# Patient Record
Sex: FEMALE | Race: WHITE | NOT HISPANIC OR LATINO | Employment: PART TIME | URBAN - NONMETROPOLITAN AREA
[De-identification: names, ages, dates, MRNs, and addresses within clinical notes are randomized per-mention and may not be internally consistent; named-entity substitution may affect disease eponyms.]

---

## 2023-06-12 ENCOUNTER — OFFICE VISIT (OUTPATIENT)
Dept: CARDIOLOGY | Facility: CLINIC | Age: 23
End: 2023-06-12
Payer: COMMERCIAL

## 2023-06-12 VITALS
BODY MASS INDEX: 28.28 KG/M2 | WEIGHT: 176 LBS | HEART RATE: 78 BPM | SYSTOLIC BLOOD PRESSURE: 120 MMHG | DIASTOLIC BLOOD PRESSURE: 70 MMHG | HEIGHT: 66 IN

## 2023-06-12 DIAGNOSIS — F41.0 PANIC ATTACKS: ICD-10-CM

## 2023-06-12 DIAGNOSIS — R00.0 TACHYCARDIA: ICD-10-CM

## 2023-06-12 DIAGNOSIS — R00.2 PALPITATIONS: Primary | ICD-10-CM

## 2023-06-12 DIAGNOSIS — R42 DIZZINESS: ICD-10-CM

## 2023-06-12 DIAGNOSIS — E88.81 METABOLIC SYNDROME: ICD-10-CM

## 2023-06-12 PROBLEM — E88.810 METABOLIC SYNDROME: Status: ACTIVE | Noted: 2023-06-12

## 2023-06-12 RX ORDER — LORATADINE 10 MG/1
10 TABLET ORAL DAILY
COMMUNITY

## 2023-06-12 RX ORDER — NORGESTIMATE AND ETHINYL ESTRADIOL 7DAYSX3 LO
1 KIT ORAL DAILY
COMMUNITY

## 2023-06-12 RX ORDER — BISOPROLOL FUMARATE 5 MG/1
5 TABLET, FILM COATED ORAL DAILY
Qty: 30 TABLET | Refills: 6 | Status: SHIPPED | OUTPATIENT
Start: 2023-06-12

## 2023-06-12 NOTE — LETTER
June 12, 2023       No Recipients    Patient: Zainab Trevino   YOB: 2000   Date of Visit: 6/12/2023       Dear Dr. Dodd Recipients:    Thank you for referring Zainab Trevino to me for evaluation. Below are the relevant portions of my assessment and plan of care.    If you have questions, please do not hesitate to call me. I look forward to following aZinab along with you.         Sincerely,        Carol Miramontes MD        CC:   No Recipients    Carol Miramontes MD  06/12/23 1405  Sign when Signing Visit  Chief Complaint   Patient presents with   • Establish Care     Referred for SVT and tachycardia. Holter report from 01/28/23, EKG, and last office note are in chart under media. Patient has never seen cardiology before. 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    • Aspirin     Patient is not on aspirin.    • Palpitations     States that she was feeling something in her chest last night every 5 to 10 minutes that would only last a second, she said if she had to rate on a pain scale, it would be about a 1.   • Dizziness     States that she has had panic attacks in the past and when she would come out of she would get dizzy for a few seconds and they would happen at random times.         CARDIAC COMPLAINTS  Dizziness and palpitations      Subjective  Zainab Trevino is a 22 y.o. female came in today for initial cardiac evaluation.  She came in with her aunt.  She has no previously diagnosed cardiac history.  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  Since January she has been having episodes of palpitation where she feels her heart racing.  The first time it happened she thought she is going to have a heart attack..  She notices her heart suddenly races lasting for few seconds to few minutes.  If it last for a longer time she has some dizziness.  She also noticed some mild shortness of breath if it persists.  She denies having any chest pain.   She did have one syncopal episode which happened after she had PPD test.  She also has history suggestive of panic attacks.  She tried taking Lexapro but she had some problem in the GI tract and stopped taking it.  She also had some lab work done and found to have a normal blood sugar, normal renal function, liver function.  Her TSH was normal.  I am not sure whether her TSH was checked at that time.  She also has been to the emergency room with above-mentioned problem.  She had a Holter monitor placed and it showed rare PAC and PVC.  She is now referred for further evaluation.  She has no history of smoking or drinking alcohol.  She drinks occasional caffeine.  There is no family history of congenital heart disease or sudden death.    Past Surgical History:   Procedure Laterality Date   • CONVERTED (HISTORICAL) HOLTER  01/28/2023    @ [place]. > 9 Days. Avg [phone]. Rare PAC & PVC       Current Outpatient Medications   Medication Sig Dispense Refill   • loratadine (CLARITIN) 10 MG tablet Take 1 tablet by mouth Daily.     • norgestimate-ethinyl estradiol (Ortho Tri-Cyclen Lo) 0.18/0.215/0.25 MG-25 MCG per tablet Take 1 tablet by mouth Daily.     • bisoprolol (ZEBeta) 5 MG tablet Take 1 tablet by mouth Daily. Start at 1/2 tab/ day for 1 week 30 tablet 6     No current facility-administered medications for this visit.           ALLERGIES:  Patient has no known allergies.    Past Medical History:   Diagnosis Date   • Anxiety    • Hx of tooth extraction        Social History     Tobacco Use   Smoking Status Never   Smokeless Tobacco Never          Family History   Problem Relation Age of Onset   • Cancer Maternal Grandmother    • Diabetes Maternal Grandmother    • Heart attack Maternal Great-Grandfather    • Cancer Maternal Great-Grandmother        Review of Systems   Constitutional: Negative for decreased appetite and malaise/fatigue.   HENT:  Negative for congestion and sore throat.    Eyes:  Negative for blurred  "vision, double vision and visual disturbance.   Cardiovascular:  Positive for palpitations. Negative for chest pain.   Respiratory:  Negative for shortness of breath and snoring.    Endocrine: Negative for cold intolerance and heat intolerance.   Hematologic/Lymphatic: Negative for adenopathy. Does not bruise/bleed easily.   Skin:  Negative for itching, nail changes and skin cancer.   Musculoskeletal:  Negative for arthritis and myalgias.   Gastrointestinal:  Negative for abdominal pain, dysphagia and heartburn.   Genitourinary:  Negative for bladder incontinence and frequency.   Neurological:  Positive for dizziness. Negative for seizures and vertigo.   Psychiatric/Behavioral:  Negative for altered mental status.    Allergic/Immunologic: Negative for environmental allergies and hives.     Diabetes- No  Thyroid- normal    Objective    /70 (BP Location: Left arm)   Pulse 78   Ht 167.6 cm (66\")   Wt 79.8 kg (176 lb)   BMI 28.41 kg/m²     Vitals and nursing note reviewed.   Constitutional:       Appearance: Healthy appearance. Not in distress.   Eyes:      Conjunctiva/sclera: Conjunctivae normal.      Pupils: Pupils are equal, round, and reactive to light.   HENT:      Head: Normocephalic.   Pulmonary:      Effort: Pulmonary effort is normal.      Breath sounds: Normal breath sounds.   Cardiovascular:      PMI at left midclavicular line. Normal rate. Regular rhythm.   Abdominal:      General: Bowel sounds are normal.      Palpations: Abdomen is soft.   Musculoskeletal: Normal range of motion.      Cervical back: Normal range of motion and neck supple. Skin:     General: Skin is warm and dry.   Neurological:      Mental Status: Alert, oriented to person, place, and time and oriented to person, place and time.       ECG 12 Lead    Date/Time: 6/12/2023 3:35 PM  Performed by: Carol Miramontes MD  Authorized by: Carol Miramontes MD   Comparison: compared with previous ECG from 1/30/2023  Similar to " previous ECG  Rhythm: sinus rhythm and sinus arrhythmia  Rate: normal  QRS axis: normal    Clinical impression: normal ECG        @ASSESSMENT/PLAN@  BMI is >= 25 and <30. (Overweight) The following options were offered after discussion;: weight loss educational material (shared in after visit summary), exercise counseling/recommendations, and nutrition counseling/recommendations     Diagnoses and all orders for this visit:    1. Palpitations (Primary)  -     bisoprolol (ZEBeta) 5 MG tablet; Take 1 tablet by mouth Daily. Start at 1/2 tab/ day for 1 week  Dispense: 30 tablet; Refill: 6  -     Treadmill Stress Test; Future  -     Adult Transthoracic Echo Complete W/ Cont if Necessary Per Protocol; Future    2. Tachycardia  -     bisoprolol (ZEBeta) 5 MG tablet; Take 1 tablet by mouth Daily. Start at 1/2 tab/ day for 1 week  Dispense: 30 tablet; Refill: 6    3. Metabolic syndrome    4. Dizziness  -     Adult Transthoracic Echo Complete W/ Cont if Necessary Per Protocol; Future    5. Panic attacks       At baseline her heart rate is stable.  Her blood pressure is normal.  Her EKG shows sinus rhythm with sinus arrhythmia, normal VT interval, normal QTc.  Her clinical examination reveals a BMI of 28.  Her cardiovascular examination is otherwise unremarkable.    Regarding the palpitation, it appears to be more of tachycardia.  I explained to her about sinus tachycardia and inappropriate sinus tachycardia.  At this time I like to start her on a low-dose of beta-blockers in the form of bisoprolol 5 mg.  She is advised to start with half a tablet a day for 1 week and then go to the 1 tablet.  I then like to do her regular stress test to evaluate her functional status, chronotropic response, blood pressure response and to look for any stress-induced arrhythmia.  She is also advised to undergo an echocardiogram to rule out any structural heart disease.  Evaluate her LV function, valvular structures as well as PA  pressure    Regarding the tachycardia, it could be a sinus tachycardia but if during the stress test at the arrhythmia is noted, based on the results she may need to go on 1C antiarrhythmic medication    Regarding her dizziness, I encouraged her to increase her fluid intake.  I like to get an echocardiogram to rule out any valvular heart disease    Regarding the metabolic syndrome, talked to her about diet and weight reduction.  I gave her papers on Mediterranean diet    Regarding her panic attacks, she is not able to tolerate Lexapro.  If it continues she may need to consider Zoloft or Prozac    Based on the results, further recommendations will be made.             Electronically signed by Carol Miramontes MD June 12, 2023 15:29 EDT

## 2023-06-12 NOTE — PROGRESS NOTES
Chief Complaint   Patient presents with   • Establish Care     Referred for SVT and tachycardia. Holter report from 01/28/23, EKG, and last office note are in chart under media. Patient has never seen cardiology before. 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    • Aspirin     Patient is not on aspirin.    • Palpitations     States that she was feeling something in her chest last night every 5 to 10 minutes that would only last a second, she said if she had to rate on a pain scale, it would be about a 1.   • Dizziness     States that she has had panic attacks in the past and when she would come out of she would get dizzy for a few seconds and they would happen at random times.         CARDIAC COMPLAINTS  Dizziness and palpitations      Subjective   Zainab Trevino is a 22 y.o. female came in today for initial cardiac evaluation.  She came in with her aunt.  She has no previously diagnosed cardiac history.  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  Since January she has been having episodes of palpitation where she feels her heart racing.  The first time it happened she thought she is going to have a heart attack..  She notices her heart suddenly races lasting for few seconds to few minutes.  If it last for a longer time she has some dizziness.  She also noticed some mild shortness of breath if it persists.  She denies having any chest pain.  She did have one syncopal episode which happened after she had PPD test.  She also has history suggestive of panic attacks.  She tried taking Lexapro but she had some problem in the GI tract and stopped taking it.  She also had some lab work done and found to have a normal blood sugar, normal renal function, liver function.  Her TSH was normal.  I am not sure whether her TSH was checked at that time.  She also has been to the emergency room with above-mentioned problem.  She had a Holter monitor placed and it showed rare PAC and  PVC.  She is now referred for further evaluation.  She has no history of smoking or drinking alcohol.  She drinks occasional caffeine.  There is no family history of congenital heart disease or sudden death.    Past Surgical History:   Procedure Laterality Date   • CONVERTED (HISTORICAL) HOLTER  01/28/2023    @ [place]. > 9 Days. Avg 74.. Rare PAC & PVC       Current Outpatient Medications   Medication Sig Dispense Refill   • loratadine (CLARITIN) 10 MG tablet Take 1 tablet by mouth Daily.     • norgestimate-ethinyl estradiol (Ortho Tri-Cyclen Lo) 0.18/0.215/0.25 MG-25 MCG per tablet Take 1 tablet by mouth Daily.     • bisoprolol (ZEBeta) 5 MG tablet Take 1 tablet by mouth Daily. Start at 1/2 tab/ day for 1 week 30 tablet 6     No current facility-administered medications for this visit.           ALLERGIES:  Patient has no known allergies.    Past Medical History:   Diagnosis Date   • Anxiety    • Hx of tooth extraction        Social History     Tobacco Use   Smoking Status Never   Smokeless Tobacco Never          Family History   Problem Relation Age of Onset   • Cancer Maternal Grandmother    • Diabetes Maternal Grandmother    • Heart attack Maternal Great-Grandfather    • Cancer Maternal Great-Grandmother        Review of Systems   Constitutional: Negative for decreased appetite and malaise/fatigue.   HENT:  Negative for congestion and sore throat.    Eyes:  Negative for blurred vision, double vision and visual disturbance.   Cardiovascular:  Positive for palpitations. Negative for chest pain.   Respiratory:  Negative for shortness of breath and snoring.    Endocrine: Negative for cold intolerance and heat intolerance.   Hematologic/Lymphatic: Negative for adenopathy. Does not bruise/bleed easily.   Skin:  Negative for itching, nail changes and skin cancer.   Musculoskeletal:  Negative for arthritis and myalgias.   Gastrointestinal:  Negative for abdominal pain, dysphagia and heartburn.   Genitourinary:   "Negative for bladder incontinence and frequency.   Neurological:  Positive for dizziness. Negative for seizures and vertigo.   Psychiatric/Behavioral:  Negative for altered mental status.    Allergic/Immunologic: Negative for environmental allergies and hives.     Diabetes- No  Thyroid- normal    Objective     /70 (BP Location: Left arm)   Pulse 78   Ht 167.6 cm (66\")   Wt 79.8 kg (176 lb)   BMI 28.41 kg/m²     Vitals and nursing note reviewed.   Constitutional:       Appearance: Healthy appearance. Not in distress.   Eyes:      Conjunctiva/sclera: Conjunctivae normal.      Pupils: Pupils are equal, round, and reactive to light.   HENT:      Head: Normocephalic.   Pulmonary:      Effort: Pulmonary effort is normal.      Breath sounds: Normal breath sounds.   Cardiovascular:      PMI at left midclavicular line. Normal rate. Regular rhythm.   Abdominal:      General: Bowel sounds are normal.      Palpations: Abdomen is soft.   Musculoskeletal: Normal range of motion.      Cervical back: Normal range of motion and neck supple. Skin:     General: Skin is warm and dry.   Neurological:      Mental Status: Alert, oriented to person, place, and time and oriented to person, place and time.       ECG 12 Lead    Date/Time: 6/12/2023 3:35 PM  Performed by: Carol Miramontes MD  Authorized by: Carol Miramontes MD   Comparison: compared with previous ECG from 1/30/2023  Similar to previous ECG  Rhythm: sinus rhythm and sinus arrhythmia  Rate: normal  QRS axis: normal    Clinical impression: normal ECG        @ASSESSMENT/PLAN@  BMI is >= 25 and <30. (Overweight) The following options were offered after discussion;: weight loss educational material (shared in after visit summary), exercise counseling/recommendations, and nutrition counseling/recommendations     Diagnoses and all orders for this visit:    1. Palpitations (Primary)  -     bisoprolol (ZEBeta) 5 MG tablet; Take 1 tablet by mouth Daily. Start at 1/2 " tab/ day for 1 week  Dispense: 30 tablet; Refill: 6  -     Treadmill Stress Test; Future  -     Adult Transthoracic Echo Complete W/ Cont if Necessary Per Protocol; Future    2. Tachycardia  -     bisoprolol (ZEBeta) 5 MG tablet; Take 1 tablet by mouth Daily. Start at 1/2 tab/ day for 1 week  Dispense: 30 tablet; Refill: 6    3. Metabolic syndrome    4. Dizziness  -     Adult Transthoracic Echo Complete W/ Cont if Necessary Per Protocol; Future    5. Panic attacks       At baseline her heart rate is stable.  Her blood pressure is normal.  Her EKG shows sinus rhythm with sinus arrhythmia, normal [place] interval, normal QTc.  Her clinical examination reveals a BMI of 28.  Her cardiovascular examination is otherwise unremarkable.    Regarding the palpitation, it appears to be more of tachycardia.  I explained to her about sinus tachycardia and inappropriate sinus tachycardia.  At this time I like to start her on a low-dose of beta-blockers in the form of bisoprolol 5 mg.  She is advised to start with half a tablet a day for 1 week and then go to the 1 tablet.  I then like to do her regular stress test to evaluate her functional status, chronotropic response, blood pressure response and to look for any stress-induced arrhythmia.  She is also advised to undergo an echocardiogram to rule out any structural heart disease.  Evaluate her LV function, valvular structures as well as PA pressure    Regarding the tachycardia, it could be a sinus tachycardia but if during the stress test at the arrhythmia is noted, based on the results she may need to go on 1C antiarrhythmic medication    Regarding her dizziness, I encouraged her to increase her fluid intake.  I like to get an echocardiogram to rule out any valvular heart disease    Regarding the metabolic syndrome, talked to her about diet and weight reduction.  I gave her papers on Mediterranean diet    Regarding her panic attacks, she is not able to tolerate Lexapro.  If it  continues she may need to consider Zoloft or Prozac    Based on the results, further recommendations will be made.             Electronically signed by Carol Miramontes MD June 12, 2023 15:29 EDT

## 2023-12-22 DIAGNOSIS — R00.2 PALPITATIONS: ICD-10-CM

## 2023-12-22 DIAGNOSIS — R00.0 TACHYCARDIA: ICD-10-CM

## 2023-12-22 RX ORDER — BISOPROLOL FUMARATE 5 MG/1
TABLET, FILM COATED ORAL
Qty: 30 TABLET | Refills: 6 | Status: SHIPPED | OUTPATIENT
Start: 2023-12-22

## 2024-01-02 ENCOUNTER — OFFICE VISIT (OUTPATIENT)
Dept: CARDIOLOGY | Facility: CLINIC | Age: 24
End: 2024-01-02
Payer: COMMERCIAL

## 2024-01-02 VITALS
HEART RATE: 78 BPM | SYSTOLIC BLOOD PRESSURE: 126 MMHG | HEIGHT: 66 IN | WEIGHT: 189.2 LBS | DIASTOLIC BLOOD PRESSURE: 70 MMHG | BODY MASS INDEX: 30.41 KG/M2

## 2024-01-02 DIAGNOSIS — R00.2 PALPITATIONS: Primary | ICD-10-CM

## 2024-01-02 DIAGNOSIS — R00.0 TACHYCARDIA: ICD-10-CM

## 2024-01-02 DIAGNOSIS — F41.9 ANXIETY: ICD-10-CM

## 2024-01-02 DIAGNOSIS — E66.9 OBESITY (BMI 30.0-34.9): ICD-10-CM

## 2024-01-02 PROCEDURE — 99214 OFFICE O/P EST MOD 30 MIN: CPT | Performed by: NURSE PRACTITIONER

## 2024-01-02 PROCEDURE — 1159F MED LIST DOCD IN RCRD: CPT | Performed by: NURSE PRACTITIONER

## 2024-01-02 PROCEDURE — 1160F RVW MEDS BY RX/DR IN RCRD: CPT | Performed by: NURSE PRACTITIONER

## 2024-01-02 RX ORDER — SERTRALINE HYDROCHLORIDE 25 MG/1
25 TABLET, FILM COATED ORAL DAILY
Qty: 30 TABLET | Refills: 3 | Status: SHIPPED | OUTPATIENT
Start: 2024-01-02

## 2024-01-02 RX ORDER — BISOPROLOL FUMARATE 5 MG/1
5 TABLET, FILM COATED ORAL DAILY
Qty: 90 TABLET | Refills: 4 | Status: SHIPPED | OUTPATIENT
Start: 2024-01-02

## 2024-01-02 RX ORDER — BISOPROLOL FUMARATE 5 MG/1
5 TABLET, FILM COATED ORAL DAILY
COMMUNITY
End: 2024-01-02 | Stop reason: SDUPTHER

## 2024-01-02 NOTE — PROGRESS NOTES
Chief Complaint   Patient presents with    Follow-up     Cardiac management. Has frequent episodes of palpitations , she feels is related to anxiety    LABS     No current  labs    Med Refill     Needs  refills  on Zebeta 90 day supply to [facility]       Subjective       Zainab Trevino is a 23 y.o. female initially seen in June 2023 for evaluation of heart palpitations sometimes associated with dizziness.  Cardiac monitor had been worn showing rare PACs and PVCs. Bisoprolol prescribed.    On 6/27/2023 echocardiogram showed normal LV size and function, No significant valve issues, and RVSP 23 mmHg.  Treadmill stress test showed mild increase in heart rate response but normal BP.  Test was negative for stress-induced ischemia or arrhythmia.    Today she returns to the office for follow-up visit.  Overall she has had improvement of palpitations since addition of bisoprolol.  However, with anxiety she continues to have increased heart rate.  She is now working as a nurse night shift, 2 different jobs.  She admits to drinking [employer] coffee and other caffeinated beverages.  In the past she tried Lexapro for management of anxiety but did not tolerate due to GI side effects.  She ask if another medication could be tried.      Cardiac History:    Past Surgical History:   Procedure Laterality Date    CARDIOVASCULAR STRESS TEST  06/27/2023    R.Stress- 9.27 Min. 11.5 METS. 90% THR. 150/46. Negative    CONVERTED (HISTORICAL) HOLTER  01/28/2023    @ [place]. > 9 Days. Avg 74.. Rare PAC & PVC    ECHO - CONVERTED  06/27/2023    EF 65%. Trace MR. RVSP- 21 mmHg       Current Outpatient Medications   Medication Sig Dispense Refill    bisoprolol (ZEBeta) 5 MG tablet Take 1 tablet by mouth Daily. 90 tablet 4    loratadine (CLARITIN) 10 MG tablet Take 1 tablet by mouth Daily.      norgestimate-ethinyl estradiol (Ortho Tri-Cyclen Lo) 0.18/0.215/0.25 MG-25 MCG per tablet Take 1 tablet by mouth Daily.      sertraline (Zoloft) 25  "MG tablet Take 1 tablet by mouth Daily. 30 tablet 3     No current facility-administered medications for this visit.       Patient has no known allergies.    Past Medical History:   Diagnosis Date    Anxiety     Hx of tooth extraction        Social History     Socioeconomic History    Marital status: Single   Tobacco Use    Smoking status: Never    Smokeless tobacco: Never   Vaping Use    Vaping Use: Never used   Substance and Sexual Activity    Alcohol use: Never    Drug use: Never       Family History   Problem Relation Age of Onset    Cancer Maternal Grandmother     Diabetes Maternal Grandmother     Heart attack Maternal Great-Grandfather     Cancer Maternal Great-Grandmother        Review of Systems   Constitutional: Positive for weight gain. Negative for diaphoresis and malaise/fatigue.   Eyes:  Negative for visual disturbance.   Cardiovascular:  Positive for palpitations.   Respiratory:  Negative for shortness of breath and sleep disturbances due to breathing.    Gastrointestinal:  Negative for change in bowel habit and nausea.   Genitourinary:  Negative for dysuria.   Psychiatric/Behavioral:  The patient is nervous/anxious.         BP Readings from Last 5 Encounters:   01/02/24 126/70   06/12/23 120/70       Wt Readings from Last 5 Encounters:   01/02/24 85.8 kg (189 lb 3.2 oz)   06/27/23 79.8 kg (175 lb 14.8 oz)   06/12/23 79.8 kg (176 lb)       Objective     /70 (BP Location: Left arm, Patient Position: Sitting)   Pulse 78   Ht 167.6 cm (66\")   Wt 85.8 kg (189 lb 3.2 oz)   BMI 30.54 kg/m²     Vitals and nursing note reviewed.   Constitutional:       Appearance: Healthy appearance. Not in distress.   Eyes:      Conjunctiva/sclera: Conjunctivae normal.      Pupils: Pupils are equal, round, and reactive to light.   HENT:      Head: Normocephalic.   Pulmonary:      Effort: Pulmonary effort is normal.      Breath sounds: Normal breath sounds.   Cardiovascular:      PMI at left midclavicular line. " Normal rate. Regular rhythm.      Murmurs: There is no murmur.   Pulses:     Intact distal pulses.   Edema:     Peripheral edema absent.   Abdominal:      General: Bowel sounds are normal.      Palpations: Abdomen is soft.   Musculoskeletal: Normal range of motion.      Cervical back: Normal range of motion and neck supple. Skin:     General: Skin is warm and dry.   Neurological:      Mental Status: Alert, oriented to person, place, and time and oriented to person, place and time.          Procedures: None today         Assessment & Plan   Diagnoses and all orders for this visit:    1. Palpitations (Primary)  -     bisoprolol (ZEBeta) 5 MG tablet; Take 1 tablet by mouth Daily.  Dispense: 90 tablet; Refill: 4    2. Tachycardia  -     bisoprolol (ZEBeta) 5 MG tablet; Take 1 tablet by mouth Daily.  Dispense: 90 tablet; Refill: 4    3. Anxiety  -     sertraline (Zoloft) 25 MG tablet; Take 1 tablet by mouth Daily.  Dispense: 30 tablet; Refill: 3    4. Obesity (BMI 30.0-34.9)      Palpitations/tachycardia  - reports of her recent stress test and echocardiogram reviewed:  EKG negative for arrhythmia.  Blood pressure response normal but heart rate response was mildly increased.    -Continue beta-blocker in form of bisoprolol 5 mg daily.  -Discussed maintaining limited caffeine.  Informational handout on heart palpitations and triggers to avoid provided  -Stress and anxiety management encouraged    Anxiety  -Discussed nonmedication management modalities  -We will trial low-dose SSRI in form of Zoloft daily    If palpitations worsen despite management then consider cardiac monitor    Advised to maintain routine labs for surveillance of electrolytes and thyroid function.    Obesity  -Weight is up 15 pounds and BMI now 30.  Discussed diet, exercise, and weight loss.    Annual follow-up visit scheduled.

## 2024-04-10 RX ORDER — FLUOXETINE 10 MG/1
10 CAPSULE ORAL DAILY
Qty: 30 CAPSULE | Refills: 6 | Status: SHIPPED | OUTPATIENT
Start: 2024-04-10

## 2024-05-07 ENCOUNTER — TELEPHONE (OUTPATIENT)
Dept: CARDIOLOGY | Facility: CLINIC | Age: 24
End: 2024-05-07
Payer: COMMERCIAL

## 2024-07-02 ENCOUNTER — TELEPHONE (OUTPATIENT)
Dept: CARDIOLOGY | Facility: CLINIC | Age: 24
End: 2024-07-02
Payer: COMMERCIAL

## 2024-07-02 NOTE — TELEPHONE ENCOUNTER
Caller: DARYL METZ    Relationship to patient: Emergency Contact    Best call back number: [phone]    Chief complaint: COULDN'T GET OFF WORK FOR HER YEARLY    Type of visit: YEARLY    Requested date: PT WORKS MONDAY, TUESDAY, AND WEDNESDAY NIGHTS. PROBABLY SOMETHING EARLY MORNING.      If rescheduling, when is the original appointment: 07.03.24     PT HAS MYCHART AND CAN LEAVE MESSAGES THERE.

## 2024-07-03 ENCOUNTER — TELEPHONE (OUTPATIENT)
Dept: CARDIOLOGY | Facility: CLINIC | Age: 24
End: 2024-07-03
Payer: COMMERCIAL

## 2024-07-03 NOTE — TELEPHONE ENCOUNTER
Caller: DARYL METZ    Relationship to patient: Emergency Contact    Best call back number: [phone]    Chief complaint: ONE PUPIL BIGGER THAN THE OTHER. CONCERNED ITS A SIGN OF STROKE    Type of visit: FOLLOW UP    Requested date: ASAP     If rescheduling, when is the original appointment: 8.26.24     Additional notes:PATIENTS AUNT CALLED TO SEE IF HER NIECE NEEDS TO BE SEEN SOONER THAN 8.26.24 REGARDING HER PUPILS BEING DIFFERENT SIZES. SHE'S WORRIED THAT'S A SIGN OF A STROKE. PLEASE CALL HER BACK WHEN POSSIBLE TO DISCUSS THIS. THANK YOU

## 2024-07-03 NOTE — TELEPHONE ENCOUNTER
Spoke with patient's aunt, Catina. She was made aware that if they are concerned about possible stroke that patient does need to go to ER. She states that patient does not have any facial drooping, slurred speech, or numbness. She states that patient worked all night and has not had much rest and noticed that pupils were different sizes.     She was made aware that patient had requested an appointment after 1 pm and first available was the 08/26/24 appointment, but that we did have an opening on 07/10/24 at 9 am with DELILAH Naqvi if patient could make that appointment. Patient's aunt asked for patient to be changed to 07/10/24 at 9 am.

## 2024-07-11 ENCOUNTER — OFFICE VISIT (OUTPATIENT)
Dept: CARDIOLOGY | Facility: CLINIC | Age: 24
End: 2024-07-11
Payer: COMMERCIAL

## 2024-07-11 VITALS
BODY MASS INDEX: 25.07 KG/M2 | HEART RATE: 72 BPM | WEIGHT: 156 LBS | HEIGHT: 66 IN | DIASTOLIC BLOOD PRESSURE: 64 MMHG | SYSTOLIC BLOOD PRESSURE: 112 MMHG

## 2024-07-11 DIAGNOSIS — R00.0 TACHYCARDIA: ICD-10-CM

## 2024-07-11 DIAGNOSIS — R00.2 PALPITATIONS: Primary | ICD-10-CM

## 2024-07-11 DIAGNOSIS — F41.9 ANXIETY: ICD-10-CM

## 2024-07-11 PROCEDURE — 99213 OFFICE O/P EST LOW 20 MIN: CPT | Performed by: NURSE PRACTITIONER

## 2024-07-11 RX ORDER — BISOPROLOL FUMARATE 5 MG/1
5 TABLET, FILM COATED ORAL DAILY
Qty: 90 TABLET | Refills: 4 | Status: SHIPPED | OUTPATIENT
Start: 2024-07-11

## 2024-07-11 NOTE — PROGRESS NOTES
Chief Complaint   Patient presents with    Follow-up     Pt is here for cardiac follow up.  Pt denies CP, SOB, dizziness or palpitations.  She does not take a daily ASA.      Med Refill     Pt request 90 day refills to be sent to [facility].  Medications were verified by the pt.      Lab Work     Pt states their last labs were over a year ago with her PCP.         Subjective       Zainab Uriel is a 23 y.o. female initially seen in June 2023 for evaluation of heart palpitations sometimes associated with dizziness.  Cardiac monitor had been worn showing rare PACs and PVCs. Bisoprolol prescribed.     On 6/27/2023 echocardiogram showed normal LV size and function, No significant valve issues, and RVSP 23 mmHg.  Treadmill stress test showed mild increase in heart rate response but normal BP.  Test was negative for stress-induced ischemia or arrhythmia.    At last visit Zoloft was prescribed for anxiety/stress management.  Beta-blocker was continued for management of palpitations.    Today she returns to the office for a follow-up visit.  From a cardiac standpoint she is doing very well with no recent palpitations or increase shortness of breath.  She admits to better management of stress and anxiety and remains on Zoloft without side effects noted.  Recently she has lost some weight and attributes to better diet.  Overall she feels she is doing very well and is planning a wedding for September of this year.    Cardiac History:    Past Surgical History:   Procedure Laterality Date    CARDIOVASCULAR STRESS TEST  06/27/2023    R.Stress- 9.27 Min. 11.5 METS. 90% THR. 150/46. Negative    CONVERTED (HISTORICAL) HOLTER  01/28/2023    @ [facility]. > 9 Days. Avg 74.. Rare PAC & PVC    ECHO - CONVERTED  06/27/2023    EF 65%. Trace MR. RVSP- 21 mmHg       Current Outpatient Medications   Medication Sig Dispense Refill    bisoprolol (ZEBeta) 5 MG tablet Take 1 tablet by mouth Daily. 90 tablet 4    sertraline (ZOLOFT) 50 MG  "tablet Take 1 tablet by mouth Daily. 90 tablet 4     No current facility-administered medications for this visit.       Patient has no known allergies.    Past Medical History:   Diagnosis Date    Anxiety     Arrhythmia 1/23    Hx of tooth extraction        Social History     Socioeconomic History    Marital status: Single   Tobacco Use    Smoking status: Never    Smokeless tobacco: Never   Vaping Use    Vaping status: Never Used   Substance and Sexual Activity    Alcohol use: Never    Drug use: Never    Sexual activity: Yes     Partners: Male     Birth control/protection: Condom     Comment: Stopped Birthcontrol pill       Family History   Problem Relation Age of Onset    Cancer Maternal Grandmother     Diabetes Maternal Grandmother     Heart failure Maternal Grandmother     Heart attack Maternal Great-Grandfather     Cancer Maternal Great-Grandmother     Heart attack Paternal Grandfather        Review of Systems   Cardiovascular: Negative.    Respiratory: Negative.     Psychiatric/Behavioral:  The patient is nervous/anxious (Currently well-managed).         BP Readings from Last 5 Encounters:   07/11/24 112/64   01/02/24 126/70   06/12/23 120/70       Wt Readings from Last 5 Encounters:   07/11/24 70.8 kg (156 lb)   01/02/24 85.8 kg (189 lb 3.2 oz)   06/27/23 79.8 kg (175 lb 14.8 oz)   06/12/23 79.8 kg (176 lb)       Objective     /64 (BP Location: Left arm, Patient Position: Sitting)   Pulse 72   Ht 167.6 cm (66\")   Wt 70.8 kg (156 lb)   BMI 25.18 kg/m²     Vitals and nursing note reviewed.   Constitutional:       Appearance: Healthy appearance. Not in distress.   Eyes:      Conjunctiva/sclera: Conjunctivae normal.      Pupils: Pupils are equal, round, and reactive to light.   HENT:      Head: Normocephalic.   Pulmonary:      Effort: Pulmonary effort is normal.      Breath sounds: Normal breath sounds.   Cardiovascular:      PMI at left midclavicular line. Normal rate. Regular rhythm.   Edema:     " Peripheral edema absent.   Abdominal:      General: Bowel sounds are normal.      Palpations: Abdomen is soft.   Musculoskeletal: Normal range of motion.      Cervical back: Normal range of motion and neck supple. Skin:     General: Skin is warm and dry.   Neurological:      Mental Status: Alert, oriented to person, place, and time and oriented to person, place and time.          Procedures: None today         Assessment & Plan   Diagnoses and all orders for this visit:    1. Palpitations (Primary)  -     bisoprolol (ZEBeta) 5 MG tablet; Take 1 tablet by mouth Daily.  Dispense: 90 tablet; Refill: 4    2. Tachycardia  -     bisoprolol (ZEBeta) 5 MG tablet; Take 1 tablet by mouth Daily.  Dispense: 90 tablet; Refill: 4    3. Anxiety    Other orders  -     sertraline (ZOLOFT) 50 MG tablet; Take 1 tablet by mouth Daily.  Dispense: 90 tablet; Refill: 4    Palpitations/tachycardia  -No recent symptoms or concerns  -BP, heart rate and rhythm normal  -Continue Zebeta 5 mg daily  -Continue conservative management including limited caffeine, adequate hydration, diet and exercise    Anxiety  -Admits currently well-managed and tolerating Zoloft well    Congratulations were given for upcoming wedding    Annual follow-up visit scheduled.             Electronically signed by DELILAH Allen,  July 11, 2024 17:35 EDT    Dictated Utilizing Dragon Dictation: Part of this note may be an electronic transcription/translation of spoken language to printed text using the Dragon Dictation System.

## 2024-12-20 RX ORDER — LABETALOL 100 MG/1
TABLET, FILM COATED ORAL
Qty: 60 TABLET | Refills: 6 | Status: SHIPPED | OUTPATIENT
Start: 2024-12-20

## 2025-07-10 ENCOUNTER — OFFICE VISIT (OUTPATIENT)
Dept: CARDIOLOGY | Facility: CLINIC | Age: 25
End: 2025-07-10
Payer: COMMERCIAL

## 2025-07-10 VITALS
HEIGHT: 66 IN | SYSTOLIC BLOOD PRESSURE: 120 MMHG | DIASTOLIC BLOOD PRESSURE: 80 MMHG | WEIGHT: 193.4 LBS | HEART RATE: 111 BPM | BODY MASS INDEX: 31.08 KG/M2 | OXYGEN SATURATION: 100 %

## 2025-07-10 DIAGNOSIS — R00.2 PALPITATIONS: ICD-10-CM

## 2025-07-10 DIAGNOSIS — E66.811 OBESITY (BMI 30.0-34.9): ICD-10-CM

## 2025-07-10 DIAGNOSIS — F41.0 PANIC ATTACKS: ICD-10-CM

## 2025-07-10 DIAGNOSIS — R00.0 SINUS TACHYCARDIA: Primary | ICD-10-CM

## 2025-07-10 PROCEDURE — 93000 ELECTROCARDIOGRAM COMPLETE: CPT | Performed by: NURSE PRACTITIONER

## 2025-07-10 PROCEDURE — 99213 OFFICE O/P EST LOW 20 MIN: CPT | Performed by: NURSE PRACTITIONER

## 2025-07-10 RX ORDER — DULOXETINE HYDROCHLORIDE 60 MG/1
CAPSULE, DELAYED RELEASE ORAL
COMMUNITY
Start: 2025-04-03

## 2025-07-10 RX ORDER — LABETALOL 100 MG/1
100 TABLET, FILM COATED ORAL 2 TIMES DAILY
Qty: 180 TABLET | Refills: 2 | Status: SHIPPED | OUTPATIENT
Start: 2025-07-10

## 2025-07-10 NOTE — PROGRESS NOTES
Chief Complaint   Patient presents with    Follow-up     Cardiac management - last seen 7/2024 with Aileen MAZARIEGOS     Palpitations     She states no palpitations or chest pain / tightness     labs     Last set 2023    medication     Went over verbally     Daily aspirin     No refills     Patient did not bring med list or medicine bottles to appointment, med list has been reviewed and updated based on patient's knowledge of their meds.         Cardiac Complaints  palpitations      Subjective   Zainab Uriel is a 24 y.o. female with palpitations and anxiety. She was initially seen in 2023 for evaluation of the same. Holter in 2023 showed PAC/PVC and tachycardia.    On 6/27/2023 echocardiogram showed normal LV size and function, No significant valve issues, and RVSP 23 mmHg. Treadmill stress test showed mild increase in heart rate response but normal BP. Test was negative for stress-induced ischemia or arrhythmia.     She comes today for follow up and admits she is no longer taking her zoloft or labetalol, now on cymbalta. She recently lost a baby and admits this has been difficult, causing more anxiety. High caffeine intake noted, but reports she has been trying to cut back. Labs are followed by PCP, she is unsure of last check.            Cardiac History  Past Surgical History:   Procedure Laterality Date    CARDIOVASCULAR STRESS TEST  06/27/2023    R.Stress- 9.27 Min. 11.5 METS. 90% THR. 150/46. Negative    CONVERTED (HISTORICAL) HOLTER  01/28/2023    @ [facility]. > 9 Days. Avg 74.. Rare PAC & PVC    ECHO - CONVERTED  06/27/2023    EF 65%. Trace MR. RVSP- 21 mmHg       Current Outpatient Medications   Medication Sig Dispense Refill    Cymbalta 60 MG capsule       labetalol (NORMODYNE) 100 MG tablet Take 1 tablet by mouth 2 (Two) Times a Day. 180 tablet 2     No current facility-administered medications for this visit.       Patient has no known allergies.    Past Medical History:   Diagnosis Date    Anxiety  "    Arrhythmia 1/23    Hx of tooth extraction        Social History     Socioeconomic History    Marital status:    Tobacco Use    Smoking status: Never    Smokeless tobacco: Never   Vaping Use    Vaping status: Never Used   Substance and Sexual Activity    Alcohol use: Never    Drug use: Never    Sexual activity: Yes     Partners: Male     Birth control/protection: Condom     Comment: Stopped Birthcontrol pill       Family History   Problem Relation Age of Onset    Cancer Maternal Grandmother     Diabetes Maternal Grandmother     Heart failure Maternal Grandmother     Heart attack Maternal Great-Grandfather     Cancer Maternal Great-Grandmother     Heart attack Paternal Grandfather        Review of Systems   Constitutional: Negative for malaise/fatigue and night sweats.   Cardiovascular:  Positive for palpitations. Negative for chest pain, claudication, dyspnea on exertion, irregular heartbeat, leg swelling, near-syncope and syncope.   Respiratory:  Negative for cough, shortness of breath and wheezing.    Musculoskeletal:  Positive for stiffness. Negative for back pain and joint pain.   Gastrointestinal:  Negative for anorexia, heartburn, nausea and vomiting.   Genitourinary:  Negative for dysuria, hematuria, hesitancy and nocturia.   Neurological:  Negative for dizziness, headaches and weakness.   Psychiatric/Behavioral:  Positive for depression. The patient is nervous/anxious.            Objective     /80 (BP Location: Left arm, Patient Position: Sitting, Cuff Size: Adult)   Pulse 111   Ht 167.6 cm (66\")   Wt 87.7 kg (193 lb 6.4 oz)   SpO2 100%   BMI 31.22 kg/m²     Constitutional:       Appearance: Not in distress.   Eyes:      Pupils: Pupils are equal, round, and reactive to light.   HENT:      Nose: Nose normal.   Pulmonary:      Effort: Pulmonary effort is normal.      Breath sounds: Normal breath sounds.   Cardiovascular:      PMI at left midclavicular line. Tachycardia present. Regular " rhythm.   Musculoskeletal: Normal range of motion.      Cervical back: Normal range of motion and neck supple. Skin:     General: Skin is warm and dry.   Neurological:      Mental Status: Alert.           ECG 12 Lead    Date/Time: 7/10/2025 1:54 PM  Performed by: Nikky Gutierrez APRN    Authorized by: Nikky Gutierrez APRN  Comparison: compared with previous ECG from 6/12/2023  Similar to previous ECG  Comparison to previous ECG: Sinus prior  Rhythm: sinus tachycardia  BPM: 121    Clinical impression: abnormal EKG  Comments: Normal QT               Diagnoses and all orders for this visit:    1. Sinus tachycardia (Primary)  -     ECG 12 Lead    2. Palpitations    3. Panic attacks    4. Obesity (BMI 30.0-34.9)    Other orders  -     labetalol (NORMODYNE) 100 MG tablet; Take 1 tablet by mouth 2 (Two) Times a Day.  Dispense: 180 tablet; Refill: 2             Palpitations/Tachycardia: No longer taking BB therapy. Will add back at 100mg 1/2 BID. May use extra tablet if needed. EKG done today showed a sinus tach with normal QT. Adequate fluid/limited caffeine urged.    Anxiety: Taking cymbalta. Followed by your office.    Condolences given on miscarriage.    Refills per request    BMI noted at 31.22, good cardiac diet urged.    Will keep visit yearly. If HR does not improve, will move appt up.        Problems Addressed this Visit          Cardiac and Vasculature    Palpitations       Mental Health    Panic attacks     Other Visit Diagnoses         Sinus tachycardia    -  Primary    Relevant Orders    ECG 12 Lead      Obesity (BMI 30.0-34.9)              Diagnoses         Codes Comments      Sinus tachycardia    -  Primary ICD-10-CM: R00.0  ICD-9-CM: 427.89       Palpitations     ICD-10-CM: R00.2  ICD-9-CM: 785.1       Panic attacks     ICD-10-CM: F41.0  ICD-9-CM: 300.01       Obesity (BMI 30.0-34.9)     ICD-10-CM: E66.811  ICD-9-CM: 278.00                       Electronically signed by DELILAH Contreras July 10,  2025 16:12 EDT